# Patient Record
Sex: MALE | Race: ASIAN | NOT HISPANIC OR LATINO | Employment: UNEMPLOYED | URBAN - METROPOLITAN AREA
[De-identification: names, ages, dates, MRNs, and addresses within clinical notes are randomized per-mention and may not be internally consistent; named-entity substitution may affect disease eponyms.]

---

## 2022-10-15 ENCOUNTER — HOSPITAL ENCOUNTER (INPATIENT)
Facility: MEDICAL CENTER | Age: 52
LOS: 1 days | DRG: 123 | End: 2022-10-17
Attending: EMERGENCY MEDICINE | Admitting: STUDENT IN AN ORGANIZED HEALTH CARE EDUCATION/TRAINING PROGRAM
Payer: COMMERCIAL

## 2022-10-15 DIAGNOSIS — E05.90 HYPERTHYROIDISM: ICD-10-CM

## 2022-10-15 DIAGNOSIS — I10 HYPERTENSION, UNSPECIFIED TYPE: ICD-10-CM

## 2022-10-15 DIAGNOSIS — H34.11 CENTRAL RETINAL ARTERY OCCLUSION OF RIGHT EYE: ICD-10-CM

## 2022-10-15 PROCEDURE — 99285 EMERGENCY DEPT VISIT HI MDM: CPT

## 2022-10-16 ENCOUNTER — APPOINTMENT (OUTPATIENT)
Dept: RADIOLOGY | Facility: MEDICAL CENTER | Age: 52
DRG: 123 | End: 2022-10-16
Attending: HOSPITALIST
Payer: COMMERCIAL

## 2022-10-16 ENCOUNTER — APPOINTMENT (OUTPATIENT)
Dept: RADIOLOGY | Facility: MEDICAL CENTER | Age: 52
DRG: 123 | End: 2022-10-16
Attending: STUDENT IN AN ORGANIZED HEALTH CARE EDUCATION/TRAINING PROGRAM
Payer: COMMERCIAL

## 2022-10-16 ENCOUNTER — APPOINTMENT (OUTPATIENT)
Dept: CARDIOLOGY | Facility: MEDICAL CENTER | Age: 52
DRG: 123 | End: 2022-10-16
Attending: STUDENT IN AN ORGANIZED HEALTH CARE EDUCATION/TRAINING PROGRAM
Payer: COMMERCIAL

## 2022-10-16 PROBLEM — G47.33 OSA (OBSTRUCTIVE SLEEP APNEA): Status: ACTIVE | Noted: 2022-10-16

## 2022-10-16 PROBLEM — R73.03 PREDIABETES: Status: ACTIVE | Noted: 2022-10-16

## 2022-10-16 PROBLEM — R20.0 RIGHT SIDED NUMBNESS: Status: ACTIVE | Noted: 2022-10-16

## 2022-10-16 PROBLEM — H34.11 CENTRAL RETINAL ARTERY OCCLUSION OF RIGHT EYE: Status: ACTIVE | Noted: 2022-10-16

## 2022-10-16 PROBLEM — E05.90 HYPERTHYROIDISM: Status: ACTIVE | Noted: 2022-10-16

## 2022-10-16 PROBLEM — I10 HTN (HYPERTENSION): Status: ACTIVE | Noted: 2022-10-16

## 2022-10-16 LAB
ALBUMIN SERPL BCP-MCNC: 4.4 G/DL (ref 3.2–4.9)
ALBUMIN/GLOB SERPL: 1.4 G/DL
ALP SERPL-CCNC: 143 U/L (ref 30–99)
ALT SERPL-CCNC: 32 U/L (ref 2–50)
ANION GAP SERPL CALC-SCNC: 11 MMOL/L (ref 7–16)
APTT PPP: 29.3 SEC (ref 24.7–36)
AST SERPL-CCNC: 18 U/L (ref 12–45)
BILIRUB SERPL-MCNC: 0.4 MG/DL (ref 0.1–1.5)
BUN SERPL-MCNC: 27 MG/DL (ref 8–22)
CALCIUM SERPL-MCNC: 9.1 MG/DL (ref 8.5–10.5)
CHLORIDE SERPL-SCNC: 107 MMOL/L (ref 96–112)
CHOLEST SERPL-MCNC: 171 MG/DL (ref 100–199)
CO2 SERPL-SCNC: 21 MMOL/L (ref 20–33)
CREAT SERPL-MCNC: 1 MG/DL (ref 0.5–1.4)
CRP SERPL HS-MCNC: 0.51 MG/DL (ref 0–0.75)
ERYTHROCYTE [SEDIMENTATION RATE] IN BLOOD BY WESTERGREN METHOD: 7 MM/HOUR (ref 0–20)
EST. AVERAGE GLUCOSE BLD GHB EST-MCNC: 131 MG/DL
GFR SERPLBLD CREATININE-BSD FMLA CKD-EPI: 91 ML/MIN/1.73 M 2
GLOBULIN SER CALC-MCNC: 3.2 G/DL (ref 1.9–3.5)
GLUCOSE SERPL-MCNC: 113 MG/DL (ref 65–99)
HBA1C MFR BLD: 6.2 % (ref 4–5.6)
HDLC SERPL-MCNC: 38 MG/DL
INR PPP: 0.97 (ref 0.87–1.13)
LDLC SERPL CALC-MCNC: 99 MG/DL
LV EJECT FRACT  99904: 60
LV EJECT FRACT MOD 2C 99903: 67.19
LV EJECT FRACT MOD 4C 99902: 69.08
LV EJECT FRACT MOD BP 99901: 67.23
MAGNESIUM SERPL-MCNC: 2.1 MG/DL (ref 1.5–2.5)
PHOSPHATE SERPL-MCNC: 3.6 MG/DL (ref 2.5–4.5)
POTASSIUM SERPL-SCNC: 4 MMOL/L (ref 3.6–5.5)
PROT SERPL-MCNC: 7.6 G/DL (ref 6–8.2)
PROTHROMBIN TIME: 12.8 SEC (ref 12–14.6)
RHEUMATOID FACT SER IA-ACNC: <10 IU/ML (ref 0–14)
SODIUM SERPL-SCNC: 139 MMOL/L (ref 135–145)
T4 FREE SERPL-MCNC: 2.02 NG/DL (ref 0.93–1.7)
TRIGL SERPL-MCNC: 168 MG/DL (ref 0–149)
TSH SERPL DL<=0.005 MIU/L-ACNC: <0.005 UIU/ML (ref 0.38–5.33)

## 2022-10-16 PROCEDURE — 36415 COLL VENOUS BLD VENIPUNCTURE: CPT

## 2022-10-16 PROCEDURE — 700101 HCHG RX REV CODE 250: Performed by: EMERGENCY MEDICINE

## 2022-10-16 PROCEDURE — A9270 NON-COVERED ITEM OR SERVICE: HCPCS | Performed by: STUDENT IN AN ORGANIZED HEALTH CARE EDUCATION/TRAINING PROGRAM

## 2022-10-16 PROCEDURE — 85730 THROMBOPLASTIN TIME PARTIAL: CPT

## 2022-10-16 PROCEDURE — 85610 PROTHROMBIN TIME: CPT

## 2022-10-16 PROCEDURE — 700102 HCHG RX REV CODE 250 W/ 637 OVERRIDE(OP): Performed by: STUDENT IN AN ORGANIZED HEALTH CARE EDUCATION/TRAINING PROGRAM

## 2022-10-16 PROCEDURE — 83735 ASSAY OF MAGNESIUM: CPT

## 2022-10-16 PROCEDURE — 70543 MRI ORBT/FAC/NCK W/O &W/DYE: CPT

## 2022-10-16 PROCEDURE — 85652 RBC SED RATE AUTOMATED: CPT

## 2022-10-16 PROCEDURE — 93880 EXTRACRANIAL BILAT STUDY: CPT

## 2022-10-16 PROCEDURE — 83036 HEMOGLOBIN GLYCOSYLATED A1C: CPT

## 2022-10-16 PROCEDURE — 99233 SBSQ HOSP IP/OBS HIGH 50: CPT | Mod: GC | Performed by: STUDENT IN AN ORGANIZED HEALTH CARE EDUCATION/TRAINING PROGRAM

## 2022-10-16 PROCEDURE — 80053 COMPREHEN METABOLIC PANEL: CPT

## 2022-10-16 PROCEDURE — 84439 ASSAY OF FREE THYROXINE: CPT

## 2022-10-16 PROCEDURE — 86431 RHEUMATOID FACTOR QUANT: CPT

## 2022-10-16 PROCEDURE — 770020 HCHG ROOM/CARE - TELE (206)

## 2022-10-16 PROCEDURE — 700117 HCHG RX CONTRAST REV CODE 255: Performed by: STUDENT IN AN ORGANIZED HEALTH CARE EDUCATION/TRAINING PROGRAM

## 2022-10-16 PROCEDURE — 93306 TTE W/DOPPLER COMPLETE: CPT | Mod: 26 | Performed by: INTERNAL MEDICINE

## 2022-10-16 PROCEDURE — 84443 ASSAY THYROID STIM HORMONE: CPT

## 2022-10-16 PROCEDURE — 700102 HCHG RX REV CODE 250 W/ 637 OVERRIDE(OP): Performed by: HOSPITALIST

## 2022-10-16 PROCEDURE — 93306 TTE W/DOPPLER COMPLETE: CPT

## 2022-10-16 PROCEDURE — 86140 C-REACTIVE PROTEIN: CPT

## 2022-10-16 PROCEDURE — 80061 LIPID PANEL: CPT

## 2022-10-16 PROCEDURE — 70553 MRI BRAIN STEM W/O & W/DYE: CPT

## 2022-10-16 PROCEDURE — 700117 HCHG RX CONTRAST REV CODE 255: Performed by: HOSPITALIST

## 2022-10-16 PROCEDURE — A9270 NON-COVERED ITEM OR SERVICE: HCPCS | Performed by: HOSPITALIST

## 2022-10-16 PROCEDURE — 94760 N-INVAS EAR/PLS OXIMETRY 1: CPT

## 2022-10-16 PROCEDURE — 70498 CT ANGIOGRAPHY NECK: CPT

## 2022-10-16 PROCEDURE — A9576 INJ PROHANCE MULTIPACK: HCPCS | Performed by: STUDENT IN AN ORGANIZED HEALTH CARE EDUCATION/TRAINING PROGRAM

## 2022-10-16 PROCEDURE — 84100 ASSAY OF PHOSPHORUS: CPT

## 2022-10-16 RX ORDER — HYDRALAZINE HYDROCHLORIDE 20 MG/ML
20 INJECTION INTRAMUSCULAR; INTRAVENOUS EVERY 6 HOURS PRN
Status: DISCONTINUED | OUTPATIENT
Start: 2022-10-16 | End: 2022-10-17 | Stop reason: HOSPADM

## 2022-10-16 RX ORDER — ONDANSETRON 2 MG/ML
4 INJECTION INTRAMUSCULAR; INTRAVENOUS EVERY 4 HOURS PRN
Status: DISCONTINUED | OUTPATIENT
Start: 2022-10-16 | End: 2022-10-17 | Stop reason: HOSPADM

## 2022-10-16 RX ORDER — AMOXICILLIN 250 MG
2 CAPSULE ORAL 2 TIMES DAILY
Status: DISCONTINUED | OUTPATIENT
Start: 2022-10-16 | End: 2022-10-17 | Stop reason: HOSPADM

## 2022-10-16 RX ORDER — PROPARACAINE HYDROCHLORIDE 5 MG/ML
1 SOLUTION/ DROPS OPHTHALMIC ONCE
Status: COMPLETED | OUTPATIENT
Start: 2022-10-16 | End: 2022-10-16

## 2022-10-16 RX ORDER — PHENYLEPHRINE HYDROCHLORIDE 25 MG/ML
1 SOLUTION/ DROPS OPHTHALMIC ONCE
Status: DISCONTINUED | OUTPATIENT
Start: 2022-10-16 | End: 2022-10-16

## 2022-10-16 RX ORDER — ACETAMINOPHEN 325 MG/1
650 TABLET ORAL EVERY 6 HOURS PRN
Status: DISCONTINUED | OUTPATIENT
Start: 2022-10-16 | End: 2022-10-17 | Stop reason: HOSPADM

## 2022-10-16 RX ORDER — LABETALOL HYDROCHLORIDE 5 MG/ML
10 INJECTION, SOLUTION INTRAVENOUS EVERY 4 HOURS PRN
Status: DISCONTINUED | OUTPATIENT
Start: 2022-10-16 | End: 2022-10-17 | Stop reason: HOSPADM

## 2022-10-16 RX ORDER — PROCHLORPERAZINE EDISYLATE 5 MG/ML
5-10 INJECTION INTRAMUSCULAR; INTRAVENOUS EVERY 4 HOURS PRN
Status: DISCONTINUED | OUTPATIENT
Start: 2022-10-16 | End: 2022-10-17 | Stop reason: HOSPADM

## 2022-10-16 RX ORDER — ONDANSETRON 4 MG/1
4 TABLET, ORALLY DISINTEGRATING ORAL EVERY 4 HOURS PRN
Status: DISCONTINUED | OUTPATIENT
Start: 2022-10-16 | End: 2022-10-17 | Stop reason: HOSPADM

## 2022-10-16 RX ORDER — TROPICAMIDE 10 MG/ML
1 SOLUTION/ DROPS OPHTHALMIC ONCE
Status: COMPLETED | OUTPATIENT
Start: 2022-10-16 | End: 2022-10-16

## 2022-10-16 RX ORDER — PROMETHAZINE HYDROCHLORIDE 25 MG/1
12.5-25 TABLET ORAL EVERY 4 HOURS PRN
Status: DISCONTINUED | OUTPATIENT
Start: 2022-10-16 | End: 2022-10-17 | Stop reason: HOSPADM

## 2022-10-16 RX ORDER — POLYETHYLENE GLYCOL 3350 17 G/17G
1 POWDER, FOR SOLUTION ORAL
Status: DISCONTINUED | OUTPATIENT
Start: 2022-10-16 | End: 2022-10-17 | Stop reason: HOSPADM

## 2022-10-16 RX ORDER — METHIMAZOLE 5 MG/1
10 TABLET ORAL EVERY EVENING
Status: DISCONTINUED | OUTPATIENT
Start: 2022-10-16 | End: 2022-10-17 | Stop reason: HOSPADM

## 2022-10-16 RX ORDER — PROMETHAZINE HYDROCHLORIDE 25 MG/1
12.5-25 SUPPOSITORY RECTAL EVERY 4 HOURS PRN
Status: DISCONTINUED | OUTPATIENT
Start: 2022-10-16 | End: 2022-10-17 | Stop reason: HOSPADM

## 2022-10-16 RX ORDER — GABAPENTIN 100 MG/1
300 CAPSULE ORAL 2 TIMES DAILY
COMMUNITY

## 2022-10-16 RX ORDER — LISINOPRIL 10 MG/1
10 TABLET ORAL
Status: DISCONTINUED | OUTPATIENT
Start: 2022-10-16 | End: 2022-10-17 | Stop reason: HOSPADM

## 2022-10-16 RX ORDER — ATORVASTATIN CALCIUM 80 MG/1
80 TABLET, FILM COATED ORAL EVERY EVENING
Status: DISCONTINUED | OUTPATIENT
Start: 2022-10-16 | End: 2022-10-17 | Stop reason: HOSPADM

## 2022-10-16 RX ORDER — BISACODYL 10 MG
10 SUPPOSITORY, RECTAL RECTAL
Status: DISCONTINUED | OUTPATIENT
Start: 2022-10-16 | End: 2022-10-17 | Stop reason: HOSPADM

## 2022-10-16 RX ORDER — PHENYLEPHRINE HYDROCHLORIDE 25 MG/ML
1 SOLUTION/ DROPS OPHTHALMIC ONCE
Status: COMPLETED | OUTPATIENT
Start: 2022-10-16 | End: 2022-10-16

## 2022-10-16 RX ORDER — LISINOPRIL 5 MG/1
10 TABLET ORAL DAILY
COMMUNITY

## 2022-10-16 RX ADMIN — PHENYLEPHRINE HYDROCHLORIDE 1 DROP: 25 SOLUTION/ DROPS OPHTHALMIC at 01:00

## 2022-10-16 RX ADMIN — GADOTERIDOL 18 ML: 279.3 INJECTION, SOLUTION INTRAVENOUS at 10:21

## 2022-10-16 RX ADMIN — TROPICAMIDE 1 DROP: 10 SOLUTION/ DROPS OPHTHALMIC at 00:45

## 2022-10-16 RX ADMIN — LISINOPRIL 10 MG: 10 TABLET ORAL at 05:32

## 2022-10-16 RX ADMIN — ASPIRIN 81 MG: 81 TABLET, COATED ORAL at 05:31

## 2022-10-16 RX ADMIN — ATORVASTATIN CALCIUM 80 MG: 80 TABLET, FILM COATED ORAL at 05:32

## 2022-10-16 RX ADMIN — IOHEXOL 75 ML: 350 INJECTION, SOLUTION INTRAVENOUS at 17:15

## 2022-10-16 RX ADMIN — SENNOSIDES AND DOCUSATE SODIUM 2 TABLET: 50; 8.6 TABLET ORAL at 05:32

## 2022-10-16 RX ADMIN — PROPARACAINE HYDROCHLORIDE 1 DROP: 5 SOLUTION/ DROPS OPHTHALMIC at 00:45

## 2022-10-16 RX ADMIN — METHIMAZOLE 10 MG: 5 TABLET ORAL at 17:33

## 2022-10-16 ASSESSMENT — PAIN DESCRIPTION - PAIN TYPE
TYPE: ACUTE PAIN

## 2022-10-16 NOTE — ED TRIAGE NOTES
"Chief Complaint   Patient presents with    Eye Problem     PT reports at 1400 today that PT lost vision in his right eye and was transferred to see ophthalmologist.       Blood Pressure (Abnormal) 150/96   Pulse 84   Temperature 36.4 °C (97.5 °F) (Temporal)   Respiration 18   Height 1.702 m (5' 7\")   Weight 83.9 kg (185 lb)   Oxygen Saturation 94%   Body Mass Index 28.98 kg/m²     "

## 2022-10-16 NOTE — ED NOTES
Pt ambulated to restroom w/ steady gait    Koppel of labs collected and sent off est PIV by EMS  Pt resting back in bed, NAD, VSS  Call light in reach

## 2022-10-16 NOTE — PROGRESS NOTES
Hospital Medicine Daily Progress Note    Date of Service  10/16/2022    Chief Complaint  Devin Prieto is a 52 y.o. male admitted 10/15/2022 with Rt vision loss    Hospital Course  No notes on file    Interval Problem Update    Right eye vision is unchanged  Denies headache  Afebrile  MRI reviewed with no acute pathology  ESR normal    I have discussed this patient's plan of care and discharge plan at IDT rounds today with Case Management, Nursing, Nursing leadership, and other members of the IDT team.    Consultants/Specialty  ophthalmology    Code Status  Full Code    Disposition  Patient is not medically cleared for discharge.   Anticipate discharge to to home with close outpatient follow-up.  I have placed the appropriate orders for post-discharge needs.    Review of Systems  ROS     Physical Exam  Temp:  [36.4 °C (97.5 °F)-37 °C (98.6 °F)] 36.9 °C (98.4 °F)  Pulse:  [60-84] 74  Resp:  [12-18] 18  BP: (150-166)/() 166/101  SpO2:  [90 %-94 %] 92 %    Physical Exam    Fluids    Intake/Output Summary (Last 24 hours) at 10/16/2022 1305  Last data filed at 10/16/2022 0500  Gross per 24 hour   Intake 120 ml   Output --   Net 120 ml       Laboratory      Recent Labs     10/16/22  0407   SODIUM 139   POTASSIUM 4.0   CHLORIDE 107   CO2 21   GLUCOSE 113*   BUN 27*   CREATININE 1.00   CALCIUM 9.1     Recent Labs     10/16/22  0407   APTT 29.3   INR 0.97         Recent Labs     10/16/22  0407   TRIGLYCERIDE 168*   HDL 38*   LDL 99       Imaging  MR-ORBITS,FACE,NECK-WITH&W/O & SEQUENCES   Final Result      MRI of the orbits without and with contrast within normal limits.      MR-BRAIN-WITH & W/O   Final Result      1.  No evidence of acute territorial infarct, intracranial hemorrhage or mass lesion.   2.  Minimal microangiopathic ischemic change versus demyelination or gliosis.      EC-ECHOCARDIOGRAM COMPLETE W/O CONT         US-CAROTID DOPPLER BILAT    (Results Pending)   CT-CTA NECK WITH & W/O-POST PROCESSING     (Results Pending)        Assessment/Plan  * Central retinal artery occlusion of right eye- (present on admission)  Assessment & Plan  New onset R sided vision loss. Seen by Dr. Aguilar with diagnosis of R central retinal artery occlusion.       Continue aspirin and atorvastatin  Check CTA of neck  Sed rate is normal  Follow-up on echocardiogram  Follow-up on serologies      Hyperthyroidism  Assessment & Plan  We will start him on methimazole  Reviewed need for outpatient follow-up    Right sided numbness- (present on admission)  Assessment & Plan  Intermittent R hand and R medial thigh numbness/tingling for the past 3 weeks.    MRI brain is negative  If symptoms persistent discussed outpatient further work-up with MRI of cervical spine      JAILYN (obstructive sleep apnea)- (present on admission)  Assessment & Plan  -Continue home CPAP    HTN (hypertension)  Assessment & Plan  Continue lisinopril       VTE prophylaxis: SCDs/TEDs    I have performed a physical exam and reviewed and updated ROS and Plan today (10/16/2022). In review of yesterday's note (10/15/2022), there are no changes except as documented above.

## 2022-10-16 NOTE — PROGRESS NOTES
Patient arrived to unit with transportation team. Patient assessed with no signs of distress or discomfort.    Patient presents with Decreased vision in the RIGHT eye with RIGHT sided deficits. MD notified. Will continue to monitor and re-assess.

## 2022-10-16 NOTE — ED PROVIDER NOTES
"ED Provider Note    CHIEF COMPLAINT  Chief Complaint   Patient presents with    Eye Problem     PT reports at 1400 today that PT lost vision in his right eye and was transferred to see ophthalmologist.         HPI  Devin Clark Prieto is a 52 y.o. male who presents to the emergency department with loss of vision to right eye.  Past medical history significant for hypertension.  Was seen at [facility] earlier [facility] and sent to this facility for further ophthalmology consultation.    The patient notes that historically he has recently had some flashes of light.  Tonight over the period of a couple hours he noticed increasing blurred vision and then ultimately realized that he could not see half of his visual field on the affected eye.  Denies headache or any other symptoms.    [facility]'s work-up including CT of the head as well as hematologic work-up all was benign.  Then sent to this facility for    REVIEW OF SYSTEMS  See HPI for further details. All other systems are negative.     PAST MEDICAL HISTORY       SOCIAL HISTORY  Social History     Tobacco Use    Smoking status: Not on file    Smokeless tobacco: Not on file   Substance and Sexual Activity    Alcohol use: Not on file    Drug use: Not on file    Sexual activity: Not on file       SURGICAL HISTORY  patient denies any surgical history    CURRENT MEDICATIONS  Home Medications    **Home medications have not yet been reviewed for this encounter**         ALLERGIES  Not on File    PHYSICAL EXAM  VITAL SIGNS: BP (!) 150/96   Pulse 84   Temp 36.4 °C (97.5 °F) (Temporal)   Resp 18   Ht 1.702 m (5' 7\")   Wt 83.9 kg (185 lb)   SpO2 94%   BMI 28.98 kg/m²  @CECILIA[[id number]::@   Pulse ox interpretation: I interpret this pulse ox as normal.  Constitutional: Alert in no apparent distress.  HENT: No signs of trauma, Bilateral external ears normal, Nose normal.   Eyes: Pupils are equal and reactive.     Right eye: Right nasal hemianopsia  Neck: Normal range of motion, " Supple  Cardiovascular: Regular rate and rhythm, no murmurs.   Thorax & Lungs: Normal breath sounds, No respiratory distress, No wheezing, No chest tenderness.   Abdomen: Bowel sounds normal, Soft, No tenderness  Skin: Warm, Dry, No erythema, No rash.   Musculoskeletal: Good range of motion in all major joints. No tenderness to palpation or major deformities noted.   Neurologic: Alert , Normal motor function, Normal sensory function, No focal deficits noted.   Psychiatric: Affect normal, Judgment normal, Mood normal.       DIAGNOSTIC STUDIES / PROCEDURES      LABS  No results found for this or any previous visit.        RADIOLOGY  EC-ECHOCARDIOGRAM COMPLETE W/O CONT    (Results Pending)   US-CAROTID DOPPLER BILAT    (Results Pending)   MR-ORBITS,FACE,NECK-WITH&W/O & SEQUENCES    (Results Pending)   MR-BRAIN-WITH & W/O    (Results Pending)         COURSE & MEDICAL DECISION MAKING  Pertinent Labs & Imaging studies reviewed. (See chart for details)    52-year-old male presenting to the emergency department as a transfer from outlying facility after initial work-up for visual loss was completed.  Initial work-up at the other facility included blood work and CT imaging which was negative.  Sent here for ophthalmology consultation.  I consulted Dr. Aguilar which has individually evaluated the patient here in the emergency department.  Additional orders from Dr. Aguilar were entered into his note.  I have ordered the hematologic portion of this.  I will additionally consult with the medicine team which will have the imaging portion of his request.  He will continue to follow.    FINAL IMPRESSION  Right CRAO        Electronically signed by: Destin Duong M.D., 10/16/2022 12:26 AM

## 2022-10-16 NOTE — CARE PLAN
The patient is Stable - Low risk of patient condition declining or worsening    Shift Goals  Clinical Goals: Maintain/improve vision and neuro status  Patient Goals: Figure out why right side is numb  Family Goals: None expressed    Progress made toward(s) clinical / shift goals:     Problem: Knowledge Deficit - Standard  Goal: Patient and family/care givers will demonstrate understanding of plan of care, disease process/condition, diagnostic tests and medications  Outcome: Progressing  Pt educated on POC for the day.     Problem: Neuro Status  Goal: Neuro status will remain stable or improve  Outcome: Progressing  Q4h neuro checks completed. Pt has right eye vision loss and right sided sensory deficits.       Patient is not progressing towards the following goals: N/A

## 2022-10-16 NOTE — PROGRESS NOTES
Ophtho  See other note for details    Exam: VA rt CF Temporally / lt 20/30  IOP 13 OU  Rt CRAO, 2-3+ milky nuclear cataract    A/p:  1. Rt central retinal artery occlusion - needs immediate CRP, ESR, platelets, blood sugar, A1C, CBC, PT / PTT, lipid profile, DAMIAN, RF, FTA-ABS, serum protein electrophoresis, carotid u/s, cardiacTTE, MRI orbits / brain

## 2022-10-16 NOTE — ASSESSMENT & PLAN NOTE
Intermittent R hand and R medial thigh numbness/tingling for the past 3 weeks.    MRI brain is negative  If symptoms persistent discussed outpatient further work-up with MRI of cervical spine

## 2022-10-16 NOTE — ED NOTES
Report to neuro  Transport at BS  Pt transported upstairs A&O x4 in stable condition w/ all belongings and chart  Wife accompanying pt

## 2022-10-16 NOTE — PROGRESS NOTES
Assumed care of pt at 0700. Pt alert and oriented x4. Denies any pain or discomfort. He does have right eye vision deficits. That pupil is larger and sluggish to react. NIH a 2 for this and for mild RUE/RLE numbness/tingling. Dr. Jevon Cruz made aware that there are no tele orders, pt is NPO despite no facial droop, and labs are indicating possible dm and hyperthyroidism. MD stated he would review labs and gave tele and diet orders. Pt educated on POC for day (MRIs, echo and carotid US). Wife at bedside. Bed low and locked, alarm set and call bell within reach. Hourly rounding continues.

## 2022-10-16 NOTE — ASSESSMENT & PLAN NOTE
New onset R sided vision loss. Seen by Dr. Aguilar with diagnosis of R central retinal artery occlusion.       Continue aspirin and atorvastatin  Check CTA of neck  Sed rate is normal  Follow-up on echocardiogram  Follow-up on serologies

## 2022-10-16 NOTE — PROGRESS NOTES
4 Eyes Skin Assessment Completed by ALICE Fox and ALICE Freed.    Head WDL  Ears WDL  Nose WDL  Mouth WDL  Neck WDL  Breast/Chest WDL  Shoulder Blades WDL  Spine WDL  (R) Arm/Elbow/Hand WDL  (L) Arm/Elbow/Hand WDL  Abdomen WDL  Groin WDL  Scrotum/Coccyx/Buttocks WDL  (R) Leg WDL  (L) Leg WDL  (R) Heel/Foot/Toe WDL  (L) Heel/Foot/Toe WDL          Devices In Places Tele Box      Interventions In Place Pillows and Pressure Redistribution Mattress    Possible Skin Injury No    Pictures Uploaded Into Epic N/A  Wound Consult Placed N/A  RN Wound Prevention Protocol Ordered No

## 2022-10-16 NOTE — H&P
[facility] Internal Medicine History & Physical Note    Date of Service  10/16/2022    [facility] Team: ELIZABETH   Attending: Peter Israel M.d.  Senior Resident: Dr. Vaughn Mir MD  Contact Number: [phone]    Primary Care Physician  Pcp Pt States None    Consultants  ophthalmology    Specialist Names: Dr. Aguilar    Code Status  Full Code    Chief Complaint  Chief Complaint   Patient presents with    Eye Problem     PT reports at 1400 today that PT lost vision in his right eye and was transferred to see ophthalmologist.         History of Presenting Illness (HPI):   Patient is a 53yo male with PMH of HTN and JAILYN on CPAP that presents after abrupt R sided visual changes at approximately 1400 yesterday. Patient states that they were hiking with friends when they noticed that their vision seemed off suddenly. When closing their L eye, they describe only seeing 20% of visual field nasally on the R eye. At this time they describe it 50/50, with R temporal visual field being completely black. They also state that for the past 3 weeks, they've been having R hand numbness and R medial thigh numbness intermittently.    In the ED, was seen by Dr. Aguilar with diagnosis of R central retinal artery occlusion and recommendations, which have been ordered. Please refer to Dr. Aguilar's note.    I discussed the plan of care with patient and family.    Review of Systems  ROS    Past Medical History   has no past medical history on file.    Surgical History   has no past surgical history on file.     Family History  family history is not on file.   Family history reviewed with patient.     Social History  Tobacco: Denies  Alcohol: 1 drink per week  Recreational drugs (illegal or prescription): Denies  Employment: Unemployed  Living Situation: Lives in [place], [place] with ex-wife and child  Recent Travel: Denies  Primary Care Provider: Reviewed Unknown  Other (stressors, spirituality, exposures): N/A    Allergies  Not on  File    Medications  Prior to Admission Medications   Prescriptions Last Dose Informant Patient Reported? Taking?   gabapentin (NEURONTIN) 100 MG Cap 10/15/2022 at [facility] Patient's Home Pharm[facility] Yes Yes   Sig: Take 300 mg by mouth 2 times a day.   lisinopril (PRINIVIL) 5 MG Tab 10/15/2022 at [facility] Patient's Home Pharmacy Yes Yes   Sig: Take 10 mg by mouth every day.      Facility-Administered Medications: None       Physical Exam  Temp:  [36.4 °C (97.5 °F)] 36.4 °C (97.5 °F)  Pulse:  [60-84] 67  Resp:  [12-18] 17  BP: (150-161)/(89-96) 161/89  SpO2:  [90 %-94 %] 92 %  Blood Pressure: (!) 154/94   Temperature: 36.4 °C (97.5 °F)   Pulse: 67   Respiration: 17   Pulse Oximetry: 92 %       Physical Exam    Laboratory:      Recent Labs     10/16/22  0407   SODIUM 139   POTASSIUM 4.0   CHLORIDE 107   CO2 21   GLUCOSE 113*   BUN 27*   CREATININE 1.00   CALCIUM 9.1     Recent Labs     10/16/22  0407   ALTSGPT 32   ASTSGOT 18   ALKPHOSPHAT 143*   TBILIRUBIN 0.4   GLUCOSE 113*     Recent Labs     10/16/22  0407   APTT 29.3   INR 0.97     No results for input(s): NTPROBNP in the last 72 hours.  Recent Labs     10/16/22  0407   TRIGLYCERIDE 168*   HDL 38*   LDL 99     No results for input(s): TROPONINT in the last 72 hours.    Imaging:  EC-ECHOCARDIOGRAM COMPLETE W/O CONT    (Results Pending)   US-CAROTID DOPPLER BILAT    (Results Pending)   MR-ORBITS,FACE,NECK-WITH&W/O & SEQUENCES    (Results Pending)   MR-BRAIN-WITH & W/O    (Results Pending)       no X-Ray or EKG requiring interpretation    Assessment/Plan:  Problem Representation:   Patient is a 51yo male with PMH of HTN and JAILYN on CPAP that presents after abrupt R sided visual changes at approximately 1400 yesterday. Seen by Dr. Aguilar with diagnosis of R central retinal artery occlusion. Started on atorvastatin and ASA, pending work-up.    I anticipate this patient will require at least two midnights for appropriate medical management, necessitating inpatient admission because  "extensive work-up.    Patient will need a Med/Surg bed on MEDICAL service .  The need is secondary to extensive work-up.    * Central retinal artery occlusion of right eye- (present on admission)  Assessment & Plan  New onset R sided vision loss. Seen by Dr. Aguilar with diagnosis of R central retinal artery occlusion. Started on atorvastatin and ASA, pending work-up. ABCD2 score 3.    -Pending MRI brain w/wo, MRI orbits w/wo, b/l carotid US, TTE, and extensive labwork; \"CRP, ESR, platelets, blood sugar, A1C, CBC, PT / PTT, lipid profile, DAMIAN, RF, FTA-ABS, serum protein electrophoresis\", also ordered TSH  -Neuro checks q4h  -Started on ASA 81mg and atorvastatin 80mg    Right sided numbness- (present on admission)  Assessment & Plan  Intermittent R hand and R medial thigh numbness/tingling for the past 3 weeks.    -Refer to \"Central retinal artery occlusion of right eye\"    JAILYN (obstructive sleep apnea)- (present on admission)  Assessment & Plan  -Continue home CPAP    HTN (hypertension)  Assessment & Plan  -Continue home lisinopril        VTE prophylaxis: SCDs/TEDs    "

## 2022-10-17 ENCOUNTER — APPOINTMENT (OUTPATIENT)
Dept: RADIOLOGY | Facility: MEDICAL CENTER | Age: 52
DRG: 123 | End: 2022-10-17
Attending: HOSPITALIST
Payer: COMMERCIAL

## 2022-10-17 VITALS
TEMPERATURE: 98.4 F | SYSTOLIC BLOOD PRESSURE: 141 MMHG | WEIGHT: 185 LBS | RESPIRATION RATE: 18 BRPM | OXYGEN SATURATION: 95 % | BODY MASS INDEX: 29.03 KG/M2 | HEIGHT: 67 IN | HEART RATE: 81 BPM | DIASTOLIC BLOOD PRESSURE: 93 MMHG

## 2022-10-17 LAB
ERYTHROCYTE [DISTWIDTH] IN BLOOD BY AUTOMATED COUNT: 39.7 FL (ref 35.9–50)
HCT VFR BLD AUTO: 51.3 % (ref 42–52)
HGB BLD-MCNC: 16.4 G/DL (ref 14–18)
MCH RBC QN AUTO: 27.9 PG (ref 27–33)
MCHC RBC AUTO-ENTMCNC: 32 G/DL (ref 33.7–35.3)
MCV RBC AUTO: 87.4 FL (ref 81.4–97.8)
PLATELET # BLD AUTO: 227 K/UL (ref 164–446)
PMV BLD AUTO: 9.2 FL (ref 9–12.9)
RBC # BLD AUTO: 5.87 M/UL (ref 4.7–6.1)
WBC # BLD AUTO: 6.4 K/UL (ref 4.8–10.8)

## 2022-10-17 PROCEDURE — 700102 HCHG RX REV CODE 250 W/ 637 OVERRIDE(OP): Performed by: STUDENT IN AN ORGANIZED HEALTH CARE EDUCATION/TRAINING PROGRAM

## 2022-10-17 PROCEDURE — 94760 N-INVAS EAR/PLS OXIMETRY 1: CPT

## 2022-10-17 PROCEDURE — 99239 HOSP IP/OBS DSCHRG MGMT >30: CPT | Performed by: HOSPITALIST

## 2022-10-17 PROCEDURE — A9270 NON-COVERED ITEM OR SERVICE: HCPCS | Performed by: STUDENT IN AN ORGANIZED HEALTH CARE EDUCATION/TRAINING PROGRAM

## 2022-10-17 PROCEDURE — 85027 COMPLETE CBC AUTOMATED: CPT

## 2022-10-17 PROCEDURE — 36415 COLL VENOUS BLD VENIPUNCTURE: CPT

## 2022-10-17 RX ORDER — ASPIRIN 81 MG/1
81 TABLET ORAL DAILY
Qty: 30 TABLET | COMMUNITY
Start: 2022-10-18

## 2022-10-17 RX ORDER — METHIMAZOLE 10 MG/1
10 TABLET ORAL EVERY EVENING
Qty: 30 TABLET | Refills: 0 | Status: SHIPPED | OUTPATIENT
Start: 2022-10-17

## 2022-10-17 RX ORDER — ATORVASTATIN CALCIUM 80 MG/1
80 TABLET, FILM COATED ORAL EVERY EVENING
Qty: 30 TABLET | Refills: 0 | Status: SHIPPED | OUTPATIENT
Start: 2022-10-17

## 2022-10-17 RX ADMIN — ATORVASTATIN CALCIUM 80 MG: 80 TABLET, FILM COATED ORAL at 00:10

## 2022-10-17 RX ADMIN — SENNOSIDES AND DOCUSATE SODIUM 2 TABLET: 50; 8.6 TABLET ORAL at 05:04

## 2022-10-17 RX ADMIN — ASPIRIN 81 MG: 81 TABLET, COATED ORAL at 05:05

## 2022-10-17 RX ADMIN — LISINOPRIL 10 MG: 10 TABLET ORAL at 05:04

## 2022-10-17 ASSESSMENT — PAIN DESCRIPTION - PAIN TYPE
TYPE: ACUTE PAIN

## 2022-10-17 NOTE — PROGRESS NOTES
Pt discharged per MD orders. IV removed.  Belongings gathered/returned to patient. Discharge instructions, medications and follow up appointments reviewed with patient and s/o They deny any further questions.

## 2022-10-17 NOTE — CARE PLAN
SR 60-80  Pr .18 QRS .07 QT .37     Progress made toward(s) clinical / shift goals:      Problem: Knowledge Deficit - Standard  Goal: Patient and family/care givers will demonstrate understanding of plan of care, disease process/condition, diagnostic tests and medications  Outcome: Progressing     Problem: Neuro Status  Goal: Neuro status will remain stable or improve  Outcome: Progressing     Problem: Self Care  Goal: Patient will have the ability to perform ADLs independently or with assistance (bathe, groom, dress, toilet and feed)  Outcome: Progressing     Problem: Respiratory  Goal: Patient will achieve/maintain optimum respiratory ventilation and gas exchange  Outcome: Progressing     Problem: Hemodynamics  Goal: Patient's hemodynamics, fluid balance and neurologic status will be stable or improve  Outcome: Progressing     Problem: Infection - Standard  Goal: Patient will remain free from infection  Outcome: Progressing     Problem: Pain - Standard  Goal: Alleviation of pain or a reduction in pain to the patient’s comfort goal  Outcome: Progressing  Note: Pt pain assessed per protocol and treated in accordance with orders.    The patient is Stable - Low risk of patient condition declining or worsening    Shift Goals  Clinical Goals: Neuro assesments Monotor BP  Patient Goals: Discharge Home  Family Goals: ADE      Problem: Nutrition  Goal: Patient's nutritional and fluid intake will be adequate or improve  Outcome: Met  Goal: Enteral nutrition will be maintained or improve  Outcome: Met  Goal: Enteral nutrition will be maintained or improve  Outcome: Met

## 2022-10-17 NOTE — DISCHARGE PLANNING
Case Management Discharge Planning    Admission Date: 10/15/2022  GMLOS: 2  ALOS: 1    6-Clicks ADL Score:    6-Clicks Mobility Score:        Anticipated Discharge Dispo: Discharge Disposition: Discharged to home/self care (01)  Discharge [facility] [place]  [address]  Discharge Contact Phone Number: [phone]    DME Needed: No    Action(s) Taken: DC Assessment Complete (See below)    Escalations Completed: None    Medically Clear: Yes    Next Steps: Pt being d/c'd home w/o any needs.      Barriers to Discharge: None

## 2022-10-17 NOTE — PROGRESS NOTES
"Assumed care of pt at 0700. Pt alert and oriented x4. Denies any pain or discomfort. Pupils are equal in size this morning (yesterday the right was larger) but the right is still sluggish to react. He reports he can see more \"light\" out of the right eye today. Denies any numbness or tingling. DC order in. Bed low and locked, call bell within reach. Hourly rounding continues.    "

## 2022-10-17 NOTE — DISCHARGE SUMMARY
Discharge Summary    CHIEF COMPLAINT ON ADMISSION  Chief Complaint   Patient presents with    Eye Problem     PT reports at 1400 today that PT lost vision in his right eye and was transferred to see ophthalmologist.         Reason for Admission  EMS     Admission Date  10/15/2022    CODE STATUS  Prior    HPI & HOSPITAL COURSE  This is a 52 y.o. male here with new onset right eye vision loss.  Initially presented to [facility] emergency department and subsequently referred to [facility] ED for ophthalmology evaluation.  The patient was evaluated by Dr. Yan Aguilar from ophthalmology and diagnosed with right central retinal artery occlusion.  He was admitted and underwent work-up with ESR and CRP which were normal and CTA and carotid duplex and echocardiogram.  His CTA revealed atherosclerotic disease with no significant carotid stenosis.  Echocardiogram did not reveal any embolic source.  MRI of the brain was negative for acute pathology and MRI of the orbits was normal .  on reevaluation this morning the patient's vision is about the same I discussed the case with Dr. Aguilar from ophthalmology who recommended that the patient follows up with ophthalmology as an outpatient otherwise no further recommendations.  The patient has his medical care established in the [place] area and he will be returning today after discharge to the [facility] and follow-up with his PCP and obtain ophthalmology referral.  The patient was incidentally noted to have hypothyroidism and he was started on Tapazole.  I reviewed with him the importance obtain referral to endocrinology and follow-up on his hypothyroidism.  The patient was started on aspirin and atorvastatin and he will continue his lisinopril at home dose for his history of hypertension.        Therefore, he is discharged in good and stable condition to home with close outpatient follow-up.    The patient met 2-midnight criteria for an inpatient stay at the time of  discharge.    Discharge Date  10/17/2022    FOLLOW UP ITEMS POST DISCHARGE  Follow-up with PCP and obtain referral to ophthalmology and endocrinology       DISCHARGE DIAGNOSES  Principal Problem:    Central retinal artery occlusion of right eye POA: Yes  Active Problems:    HTN (hypertension) POA: Unknown    JAILYN (obstructive sleep apnea) POA: Yes    Right sided numbness POA: Yes    Hyperthyroidism POA: Unknown    Prediabetes POA: Unknown  Resolved Problems:    * No resolved hospital problems. *      FOLLOW UP  No future appointments.  Yan Aguilar M.D.  [address]  [address]  [place] [address]  [phone]    Follow up      Primary Care Provider    Follow up in 2 week(s)  Post-hospital follow-up and referral to Endocrinology      MEDICATIONS ON DISCHARGE     Medication List        START taking these medications        Instructions   aspirin 81 MG EC tablet  Start taking on: October 18, 2022   Take 1 Tablet by mouth every day.  Dose: 81 mg     atorvastatin 80 MG tablet  Commonly known as: LIPITOR   Take 1 Tablet by mouth every evening.  Dose: 80 mg     methimazole 10 MG Tabs  Commonly known as: TAPAZOLE   Take 1 Tablet by mouth every evening.  Dose: 10 mg            CONTINUE taking these medications        Instructions   gabapentin 100 MG Caps  Commonly known as: NEURONTIN   Take 300 mg by mouth 2 times a day.  Dose: 300 mg     lisinopril 5 MG Tabs  Commonly known as: PRINIVIL   Take 10 mg by mouth every day.  Dose: 10 mg              Allergies  Not on File    DIET  No orders of the defined types were placed in this encounter.      ACTIVITY  As tolerated.  Weight bearing as tolerated    CONSULTATIONS  Ophthalmology    PROCEDURES       LABORATORY  Lab Results   Component Value Date    SODIUM 139 10/16/2022    POTASSIUM 4.0 10/16/2022    CHLORIDE 107 10/16/2022    CO2 21 10/16/2022    GLUCOSE 113 (H) 10/16/2022    BUN 27 (H) 10/16/2022    CREATININE 1.00 10/16/2022        Lab Results   Component Value Date    WBC  6.4 10/17/2022    HEMOGLOBIN 16.4 10/17/2022    HEMATOCRIT 51.3 10/17/2022    PLATELETCT 227 10/17/2022        Total time of the discharge process exceeds 32 minutes.